# Patient Record
Sex: MALE | Race: WHITE | NOT HISPANIC OR LATINO | Employment: OTHER | URBAN - METROPOLITAN AREA
[De-identification: names, ages, dates, MRNs, and addresses within clinical notes are randomized per-mention and may not be internally consistent; named-entity substitution may affect disease eponyms.]

---

## 2017-07-07 ENCOUNTER — OFFICE VISIT (OUTPATIENT)
Dept: CARDIOLOGY | Facility: CLINIC | Age: 72
End: 2017-07-07

## 2017-07-07 VITALS
WEIGHT: 194 LBS | SYSTOLIC BLOOD PRESSURE: 102 MMHG | BODY MASS INDEX: 29.4 KG/M2 | HEIGHT: 68 IN | HEART RATE: 74 BPM | DIASTOLIC BLOOD PRESSURE: 59 MMHG

## 2017-07-07 DIAGNOSIS — I10 ESSENTIAL HYPERTENSION: ICD-10-CM

## 2017-07-07 DIAGNOSIS — E78.5 DYSLIPIDEMIA: ICD-10-CM

## 2017-07-07 DIAGNOSIS — I25.10 CORONARY ARTERY DISEASE INVOLVING NATIVE CORONARY ARTERY OF NATIVE HEART WITHOUT ANGINA PECTORIS: Primary | ICD-10-CM

## 2017-07-07 PROCEDURE — 99213 OFFICE O/P EST LOW 20 MIN: CPT | Performed by: INTERNAL MEDICINE

## 2017-07-07 NOTE — PROGRESS NOTES
Encounter Date:07/07/2017      Patient ID: Adam Coffey is a 72 y.o. male.    Chief Complaint: Coronary Artery Disease    PROBLEM LIST:  1. Coronary artery disease:  a.  Onset of cardiac symptoms with chest pain in 2004.  b. Subsequent office evaluation revealed abnormal EKG.  c. Cardiac catheterization study by Dr. Layton in 2004, showed totally occluded LAD, no other significant disease, ejection fraction 45% with large  anteroapical inferior left ventricular aneurysm, subsequent medical management.  d. Most recent Cardiolite stress test, June 2012, by Dr. Layton was remarkable for submaximal exercise and ejection fraction 58% with previous scar noted.  No evidence of  acute ischemia.  e. Echocardiogram 07/14/2015, showed an ejection fraction of 55% to 60% with mild mitral, mild tricuspid and trace pulmonic insufficiency.    2. Ischemic cardiomyopathy as mentioned above with subsequent improvement of LV function.  3. History of hypertension.  4. History of dyslipidemia.  5. Degenerative  Arthritis.  6. Surgical Hx:  a. Status post remote back surgery.    History of Present Illness  Patient presents today for follow-up of CAD, cardiomyopathy and cardiac risk factors. Has been doing great from a cardiac standpoint. Compliant with all medications. Missed his last appointment as he suffered a viral infection and states that he felt great from a cardiac standpoint at that time too. Denies chest pain, shortness of breath, leg swelling, palpitations, and syncope. Remains busy and active using the elliptical and weight training.    No Known Allergies      Current Outpatient Prescriptions:   •  aspirin 325 MG tablet, Take 325 mg by mouth daily., Disp: , Rfl:   •  atorvastatin (LIPITOR) 10 MG tablet, Take 10 mg by mouth daily., Disp: , Rfl:   •  carvedilol (COREG) 25 MG tablet, 25 mg 2 (two) times a day., Disp: , Rfl:   •  fluticasone (FLONASE) 50 MCG/ACT nasal spray, 1 spray into each nostril daily., Disp: , Rfl:  "  •  FOLIC ACID PO, Take  by mouth daily., Disp: , Rfl:   •  LORazepam (ATIVAN) 1 MG tablet, Take 1 mg by mouth every 8 (eight) hours as needed for anxiety., Disp: , Rfl:   •  quinapril (ACCUPRIL) 20 MG tablet, Take 10 mg by mouth daily., Disp: , Rfl:   •  sertraline (ZOLOFT) 100 MG tablet, 100 mg daily., Disp: , Rfl:   •  Tetrahydrozoline HCl (EYE DROPS OP), Apply  to eye daily., Disp: , Rfl:   •  traMADol (ULTRAM) 50 MG tablet, Take 50 mg by mouth every 6 (six) hours as needed for moderate pain (4-6)., Disp: , Rfl:     The following portions of the patient's history were reviewed and updated as appropriate: allergies, current medications, past family history, past medical history, past social history, past surgical history and problem list.    ROS  Review of Systems   Constitution: Negative for chills, fever, weight gain and weight loss.   Cardiovascular: Negative for chest pain, claudication, dyspnea on exertion, leg swelling, orthopnea, palpitations, paroxysmal nocturnal dyspnea and syncope.        No dizziness   Gastrointestinal: Negative for abdominal pain, constipation, diarrhea, nausea and vomiting.   Genitourinary:        No urinary symptoms   Neurological:        No symptoms of stroke.   All other systems reviewed and are negative.    Objective:     Blood pressure 102/59, pulse 74, height 68\" (172.7 cm), weight 194 lb (88 kg).  Repeat measurement by Brandt Castle MD at 108/64      Physical Exam  Constitutional: She appears well-developed and well-nourished.   HENT:   HEENT exam unremarkable.   Neck: Neck supple. No JVD present.   No carotid bruits.   Cardiovascular: Normal rate, regular rhythm and normal heart sounds.    No murmur heard.  2 plus symmetric pulses.   Pulmonary/Chest: Breath sounds normal. Does not exhibit tenderness.   Abdominal:   Abdomen benign.   Musculoskeletal: Does not exhibit edema.   Neurological:   Neurological exam unremarkable.   Vitals reviewed.    Lab Review:   CBC, HCV, TSH, CMP " are all WNL.  Lipid Panel      HDL 49  LDL 59  Procedures       Assessment:      Diagnosis Plan   1. Coronary artery disease involving native coronary artery of native heart without angina pectoris     2. Essential hypertension     3. Dyslipidemia       Plan:   Patient is doing well overall from a cardiac standpoint.  Continue current medications.   FU in 12 MO, sooner as needed.  Thank you for allowing us to participate in the care of your patient.     I, Brandt Castle MD, personally performed the services described in this documentation as scribed by the above named individual in my presence, and it is both accurate and complete.  7/7/2017  11:53 AM        Please note that portions of this note may have been completed with a voice recognition program. Efforts were made to edit the dictations, but occasionally words are mistranscribed.

## 2018-07-13 ENCOUNTER — OFFICE VISIT (OUTPATIENT)
Dept: CARDIOLOGY | Facility: CLINIC | Age: 73
End: 2018-07-13

## 2018-07-13 VITALS
SYSTOLIC BLOOD PRESSURE: 104 MMHG | BODY MASS INDEX: 29.4 KG/M2 | HEART RATE: 75 BPM | HEIGHT: 68 IN | WEIGHT: 194 LBS | DIASTOLIC BLOOD PRESSURE: 62 MMHG

## 2018-07-13 DIAGNOSIS — I25.10 CORONARY ARTERY DISEASE INVOLVING NATIVE CORONARY ARTERY OF NATIVE HEART WITHOUT ANGINA PECTORIS: Primary | ICD-10-CM

## 2018-07-13 DIAGNOSIS — I10 ESSENTIAL HYPERTENSION: ICD-10-CM

## 2018-07-13 DIAGNOSIS — E78.5 DYSLIPIDEMIA: ICD-10-CM

## 2018-07-13 PROCEDURE — 99214 OFFICE O/P EST MOD 30 MIN: CPT | Performed by: PHYSICIAN ASSISTANT

## 2018-07-13 NOTE — PROGRESS NOTES
Encounter Date:07/13/2018      Patient ID: Adam Coffey is a 73 y.o. male.    Albert Alejandro MD    Chief Complaint: Coronary Artery Disease        Patient Active Problem List    Diagnosis   • CAD (coronary artery disease) [I25.10]     Priority: High     Overview Note:       a. Cardiac catheterization study by Dr. Layton in 2004, showed totally occluded LAD, no other significant disease, ejection fraction 45% with large anteroapical inferior left ventricular aneurysm, subsequent medical management.  b. Most recent Cardiolite stress test, June 2012, by Dr. Layton was remarkable for submaximal exercise and ejection fraction 58% with previous scar noted.  No evidence of acute ischemia.  c. Echocardiogram 07/14/2015, showed an ejection fraction of 55% to 60% with mild mitral, mild tricuspid and trace pulmonic insufficiency     • HTN (hypertension) [I10]     Priority: Medium   • Dyslipidemia [E78.5]   • Degenerative arthritis [M19.90]         History of Present Illness  Patient presents today for follow-up with a history of CAD, ischemic cardiomyopathy with subsequent normalization, hypertension and dyslipidemia.  He returns today for annual follow-up.  He has no current complaint of exertional chest pain, no orthopnea, no PND no lower extremity edema.  He has no exertional dyspnea.  He has no awareness of tachycardia arrhythmias, no dizziness or syncope.  States he checks his blood pressure at home and it generally runs 120 to 130s.  His cholesterol is followed by his primary care physician and reported to be favorable.  His only complaint today is easy bruising of the upper extremities.  He states compliance with his medical regimen and reports no other significant side effects.    No Known Allergies      Current Outpatient Prescriptions:   •  aspirin 325 MG tablet, Take 325 mg by mouth daily., Disp: , Rfl:   •  atorvastatin (LIPITOR) 10 MG tablet, Take 10 mg by mouth daily., Disp: , Rfl:   •  carvedilol (COREG)  "25 MG tablet, 25 mg 2 (two) times a day., Disp: , Rfl:   •  fluticasone (FLONASE) 50 MCG/ACT nasal spray, 1 spray into each nostril daily., Disp: , Rfl:   •  FOLIC ACID PO, Take  by mouth daily., Disp: , Rfl:   •  LORazepam (ATIVAN) 1 MG tablet, Take 1 mg by mouth every 8 (eight) hours as needed for anxiety., Disp: , Rfl:   •  sertraline (ZOLOFT) 100 MG tablet, 100 mg daily., Disp: , Rfl:   •  traMADol (ULTRAM) 50 MG tablet, Take 50 mg by mouth every 6 (six) hours as needed for moderate pain (4-6)., Disp: , Rfl:     The following portions of the patient's history were reviewed and updated as appropriate: allergies, current medications, past family history, past medical history, past social history, past surgical history and problem list.    Review of Systems   Constitution: Negative for diaphoresis, weakness, malaise/fatigue and weight gain.   Cardiovascular: Negative for chest pain, claudication, dyspnea on exertion, irregular heartbeat, leg swelling, orthopnea, palpitations, paroxysmal nocturnal dyspnea and syncope.   Respiratory: Negative for cough, shortness of breath, sleep disturbances due to breathing and wheezing.    Hematologic/Lymphatic: Negative for bleeding problem.   Musculoskeletal: Negative for muscle cramps, muscle weakness and myalgias.   Gastrointestinal: Negative for heartburn.     .    Objective:     Blood pressure 104/62, pulse 75, height 172.7 cm (68\"), weight 88 kg (194 lb).           Body mass index is 29.5 kg/m².     Physical Exam   Constitutional: He is oriented to person, place, and time. He appears well-developed and well-nourished.   Cardiovascular: Normal rate, regular rhythm, normal heart sounds and intact distal pulses.  Exam reveals no gallop and no friction rub.    No murmur heard.  Pulmonary/Chest: Effort normal and breath sounds normal. No respiratory distress. He has no wheezes. He has no rales. He exhibits no tenderness.   Bases clear   Musculoskeletal: Normal range of motion. " He exhibits no edema.   Neurological: He is alert and oriented to person, place, and time.       Lab Review:                           Procedures             Assessment:      Diagnosis Plan   1. Coronary artery disease involving native coronary artery of native heart without angina pectoris  Stable    2. Dyslipidemia  On statin therapy and followed by primary care    3. Essential hypertension  Well controlled current medical regimen      Plan:     He may decrease aspirin to 81 mg daily to reduce bruising    Stable cardiac status.  Continue current medications.   in 12 months, sooner as needed.  Thank you for allowing us to participate in the care of your patient.     JEANNA Scherer  07/13/18  1:39 PM      Please note that portions of this note may have been completed with a voice recognition program. Efforts were made to edit the dictations, but occasionally words are mistr

## 2024-04-03 NOTE — PROGRESS NOTES
"[facility]  Cardiology Consultation Note     Adam Coffey  1945  Requesting Provider: Albert Alejandro MD  PCP: Albert Alejandro MD    ID:  Adam Coffey is a 79 y.o. male who resides in [place], [place].     REASON FOR CONSULTATION:    CAD  Ischemic heart disease         Dear Dr. Alejandro:    Thank you for referring Adam Coffey to my office to establish care of his coronary disease and cardiovascular risk factors.  He is a 79-year-old gentleman who was initially taken care of by Dr. Slade Layton.  In 2004, the patient underwent cardiac catheterization and was found to have chronic occlusion of the LAD with LVEF 45% and anterior apical ventricular aneurysm.  Medical treatment was recommended.  The patient has had a stable clinical course since that time.  An echocardiogram in 2015 showed normal LV systolic function.  Patient's last ischemic evaluation was in 2012.    The patient recently has been not wanting to do activities.  He does not describe chest pressure, but does state he does feel more short of breath than he has in the past.  Denies orthopnea or PND.  He feels like he does not \"want\" to do activity.  His wife remarks that he is on an antidepressant medication.      Past Medical History, Past Surgical History, Family history, Social History, and Medications were all reviewed with the patient today and updated as necessary.       Current Outpatient Medications:     aspirin 81 MG EC tablet, Take 1 tablet by mouth Daily., Disp: 90 tablet, Rfl: 3    atorvastatin (Lipitor) 10 MG tablet, Take 1 tablet by mouth Daily., Disp: 90 tablet, Rfl: 3    carvedilol (COREG) 25 MG tablet, Take 1 tablet by mouth 2 (Two) Times a Day With Meals., Disp: 180 tablet, Rfl: 3    fluticasone (FLONASE) 50 MCG/ACT nasal spray, 1 spray into the nostril(s) as directed by provider Daily., Disp: , Rfl:     FOLIC ACID PO, Take  by mouth daily., Disp: , Rfl:     LORazepam (ATIVAN) 1 MG tablet, Take 1 tablet by " "mouth Every 8 (Eight) Hours As Needed for Anxiety., Disp: , Rfl:     sertraline (ZOLOFT) 100 MG tablet, 1 tablet Daily., Disp: , Rfl:     tamsulosin (FLOMAX) 0.4 MG capsule 24 hr capsule, Take 1 capsule by mouth Daily., Disp: , Rfl:     traMADol (ULTRAM) 50 MG tablet, Take 1 tablet by mouth Every 6 (Six) Hours As Needed for Moderate Pain., Disp: , Rfl:     No Known Allergies      Past Medical History:   Diagnosis Date    CAD (coronary artery disease)     Degenerative arthritis     Dyslipidemia     Elevated blood sugar     HTN (hypertension)     Ischemic cardiomyopathy        Past Surgical History:   Procedure Laterality Date    BACK SURGERY      CARDIAC CATHETERIZATION      KNEE ARTHROPLASTY, PARTIAL REPLACEMENT      REPLACEMENT TOTAL KNEE         Family History   Problem Relation Age of Onset    Cancer Mother     Heart attack Father        Social History     Tobacco Use    Smoking status: Former     Types: Cigarettes     Start date:      Quit date:      Years since quittin.3     Passive exposure: Past    Smokeless tobacco: Never    Tobacco comments:     quit 40 years ago   Substance Use Topics    Alcohol use: Not on file     Comment: occasionally       Review of Systems   Constitutional: Positive for malaise/fatigue.   Respiratory:  Positive for shortness of breath.    Neurological:  Positive for dizziness.               /74 (BP Location: Left arm, Patient Position: Sitting)   Pulse 72   Ht 172.7 cm (68\")   Wt 85.5 kg (188 lb 6.4 oz)   SpO2 97%   BMI 28.65 kg/m²        Constitutional:       Appearance: Healthy appearance. Well-developed.   Eyes:      General: Lids are normal. No scleral icterus.     Conjunctiva/sclera: Conjunctivae normal.   HENT:      Head: Normocephalic and atraumatic.   Neck:      Thyroid: No thyromegaly.      Vascular: No carotid bruit or JVD.   Pulmonary:      Effort: Pulmonary effort is normal.      Breath sounds: Normal breath sounds. No wheezing. No rhonchi. No " rales.   Cardiovascular:      Normal rate. Regular rhythm.      Murmurs: There is no murmur.      No gallop.  No rub.   Pulses:     Intact distal pulses.   Edema:     Peripheral edema absent.   Abdominal:      General: There is no distension.      Palpations: Abdomen is soft. There is no abdominal mass.   Musculoskeletal:      Cervical back: Normal range of motion. Skin:     General: Skin is warm and dry.      Findings: No rash.   Neurological:      General: No focal deficit present.      Mental Status: Alert and oriented to person, place, and time.      Gait: Gait is intact.   Psychiatric:         Attention and Perception: Attention normal.         Mood and Affect: Mood normal.         Behavior: Behavior normal.         EKG (3/6/2024): Sinus rhythm.  Left axis deviation.  Incomplete right bundle maryam block.  Anterior lateral infarct      Labs (12/4/2023):  WBC 4.4, RBC 4.45, HGB 10.6, HCT 35.3,   HA1C 6.1  Chol 126, Trig 98, HDL 45, LDL 63  Glucose 108, creat 1.06, BUN 14, Na 143, K 4.5, AST 16, ALT 10  TSH 2.96         Diagnoses and all orders for this visit:    1. Coronary artery disease involving native coronary artery of native heart with angina pectoris (Primary)  Overview:  Cardiac catheterization with Slade Layton (2004): Occluded LAD.  LVEF 45% with large anteroapical inferior left ventricular aneurysm.  Medical management  Nuclear stress (June 2012): Dr. Layton was remarkable for submaximal exercise and ejection fraction 58% with previous scar noted.  No evidence of acute ischemia.  Echo (07/14/2015): LVEF 55% to 60% with mild MR and TR.    Assessment & Plan:  No angina but possible heart failure symptoms  No recent ischemic evaluation  Recommend pharmacologic nuclear stress   Continue aspirin, beta-blocker, statin    Orders:  -     aspirin 81 MG EC tablet; Take 1 tablet by mouth Daily.  Dispense: 90 tablet; Refill: 3  -     Adult Transthoracic Echo Complete W/ Cont if Necessary Per Protocol;  Future  -     Stress Test With Myocardial Perfusion (1 Day); Future    2. Chronic HFrEF (heart failure with reduced ejection fraction)  Overview:  LVEF 45% on left ventriculogram, 2004  Echo (7/14/2015): LVEF 58%    Assessment & Plan:  Obtain proBNP  Obtain echo    Orders:  -     carvedilol (COREG) 25 MG tablet; Take 1 tablet by mouth 2 (Two) Times a Day With Meals.  Dispense: 180 tablet; Refill: 3  -     Adult Transthoracic Echo Complete W/ Cont if Necessary Per Protocol; Future  -     Stress Test With Myocardial Perfusion (1 Day); Future  -     proBNP; Future  -     Comprehensive Metabolic Panel; Future    3. Hyperlipidemia LDL goal <70  Overview:  High intensity statin therapy indicated given the presence of CAD    Assessment & Plan:  LDL well-controlled  Continue atorvastatin    Orders:  -     atorvastatin (Lipitor) 10 MG tablet; Take 1 tablet by mouth Daily.  Dispense: 90 tablet; Refill: 3    4. Primary hypertension  Overview:  Target blood pressure <130/80 mmHg    Assessment & Plan:  Well-controlled      5. Dyspnea on exertion  Assessment & Plan:  Functional class III shortness of breath  Obtain proBNP  Obtain echo  If evidence of HFpEF based on pro BNP, will consider SGL 2 inhibitor  If evidence of HFrEF based on echo and proBNP, will recommend ARB, SGL 2, and MRA                   proBNP  Echo  Nuclear stress test  Further recommendations to follow  Return in about 3 months (around 7/9/2024) for Follow-up with cardiology APRN/PA.        KRYSTYNA Trujillo MD, FAC, [facility]  Interventional Cardiology  04/09/24  10:49 EDT

## 2024-04-09 ENCOUNTER — LAB (OUTPATIENT)
Dept: LAB | Facility: HOSPITAL | Age: 79
End: 2024-04-09
Payer: MEDICARE

## 2024-04-09 ENCOUNTER — PATIENT ROUNDING (BHMG ONLY) (OUTPATIENT)
Dept: CARDIOLOGY | Facility: CLINIC | Age: 79
End: 2024-04-09
Payer: MEDICARE

## 2024-04-09 ENCOUNTER — OFFICE VISIT (OUTPATIENT)
Dept: CARDIOLOGY | Facility: CLINIC | Age: 79
End: 2024-04-09
Payer: MEDICARE

## 2024-04-09 VITALS
HEART RATE: 72 BPM | DIASTOLIC BLOOD PRESSURE: 74 MMHG | SYSTOLIC BLOOD PRESSURE: 124 MMHG | OXYGEN SATURATION: 97 % | HEIGHT: 68 IN | BODY MASS INDEX: 28.55 KG/M2 | WEIGHT: 188.4 LBS

## 2024-04-09 DIAGNOSIS — I50.22 CHRONIC HFREF (HEART FAILURE WITH REDUCED EJECTION FRACTION): ICD-10-CM

## 2024-04-09 DIAGNOSIS — E78.5 HYPERLIPIDEMIA LDL GOAL <70: ICD-10-CM

## 2024-04-09 DIAGNOSIS — R06.09 DYSPNEA ON EXERTION: ICD-10-CM

## 2024-04-09 DIAGNOSIS — I10 PRIMARY HYPERTENSION: ICD-10-CM

## 2024-04-09 DIAGNOSIS — I25.119 CORONARY ARTERY DISEASE INVOLVING NATIVE CORONARY ARTERY OF NATIVE HEART WITH ANGINA PECTORIS: Primary | ICD-10-CM

## 2024-04-09 LAB
ALBUMIN SERPL-MCNC: 4.3 G/DL (ref 3.5–5.2)
ALBUMIN/GLOB SERPL: 2 G/DL
ALP SERPL-CCNC: 61 U/L (ref 39–117)
ALT SERPL W P-5'-P-CCNC: 17 U/L (ref 1–41)
ANION GAP SERPL CALCULATED.3IONS-SCNC: 7.8 MMOL/L (ref 5–15)
AST SERPL-CCNC: 15 U/L (ref 1–40)
BILIRUB SERPL-MCNC: 0.6 MG/DL (ref 0–1.2)
BUN SERPL-MCNC: 17 MG/DL (ref 8–23)
BUN/CREAT SERPL: 14.5 (ref 7–25)
CALCIUM SPEC-SCNC: 9.3 MG/DL (ref 8.6–10.5)
CHLORIDE SERPL-SCNC: 107 MMOL/L (ref 98–107)
CO2 SERPL-SCNC: 28.2 MMOL/L (ref 22–29)
CREAT SERPL-MCNC: 1.17 MG/DL (ref 0.76–1.27)
EGFRCR SERPLBLD CKD-EPI 2021: 64.2 ML/MIN/1.73
GLOBULIN UR ELPH-MCNC: 2.2 GM/DL
GLUCOSE SERPL-MCNC: 96 MG/DL (ref 65–99)
NT-PROBNP SERPL-MCNC: 532 PG/ML (ref 0–1800)
POTASSIUM SERPL-SCNC: 4.8 MMOL/L (ref 3.5–5.2)
PROT SERPL-MCNC: 6.5 G/DL (ref 6–8.5)
SODIUM SERPL-SCNC: 143 MMOL/L (ref 136–145)

## 2024-04-09 PROCEDURE — 3078F DIAST BP <80 MM HG: CPT | Performed by: INTERNAL MEDICINE

## 2024-04-09 PROCEDURE — 36415 COLL VENOUS BLD VENIPUNCTURE: CPT

## 2024-04-09 PROCEDURE — 3074F SYST BP LT 130 MM HG: CPT | Performed by: INTERNAL MEDICINE

## 2024-04-09 PROCEDURE — 83880 ASSAY OF NATRIURETIC PEPTIDE: CPT

## 2024-04-09 PROCEDURE — 80053 COMPREHEN METABOLIC PANEL: CPT

## 2024-04-09 PROCEDURE — 1159F MED LIST DOCD IN RCRD: CPT | Performed by: INTERNAL MEDICINE

## 2024-04-09 PROCEDURE — 99204 OFFICE O/P NEW MOD 45 MIN: CPT | Performed by: INTERNAL MEDICINE

## 2024-04-09 PROCEDURE — 1160F RVW MEDS BY RX/DR IN RCRD: CPT | Performed by: INTERNAL MEDICINE

## 2024-04-09 RX ORDER — ASPIRIN 81 MG/1
81 TABLET ORAL DAILY
Qty: 90 TABLET | Refills: 3 | Status: SHIPPED | OUTPATIENT
Start: 2024-04-09

## 2024-04-09 RX ORDER — CARVEDILOL 25 MG/1
25 TABLET ORAL 2 TIMES DAILY WITH MEALS
Qty: 180 TABLET | Refills: 3 | Status: SHIPPED | OUTPATIENT
Start: 2024-04-09

## 2024-04-09 RX ORDER — TAMSULOSIN HYDROCHLORIDE 0.4 MG/1
0.4 CAPSULE ORAL DAILY
COMMUNITY
Start: 2024-03-22

## 2024-04-09 RX ORDER — ATORVASTATIN CALCIUM 10 MG/1
10 TABLET, FILM COATED ORAL DAILY
Qty: 90 TABLET | Refills: 3 | Status: SHIPPED | OUTPATIENT
Start: 2024-04-09

## 2024-04-09 NOTE — PROGRESS NOTES
April 9, 2024    Hello, may I speak with Adam Coffey? Yes.    My name is Beatriz BERRY      I am  with [facility]  [address]  [place] [address] [phone].    Before we get started may I verify your date of birth? 1945, Yes, correct.    I am calling to officially welcome you to our practice and ask about your recent visit. Is this a good time to talk? Yes.    Tell me about your visit with us. What things went well?  Everything.       We're always looking for ways to make our patients' experiences even better. Do you have recommendations on ways we may improve?  No.    Overall were you satisfied with your first visit to our practice? Yes.       I appreciate you taking the time to speak with me today. Is there anything else I can do for you? No.      Thank you, and have a great day.

## 2024-04-09 NOTE — ASSESSMENT & PLAN NOTE
Functional class III shortness of breath  Obtain proBNP  Obtain echo  If evidence of HFpEF based on pro BNP, will consider SGL 2 inhibitor  If evidence of HFrEF based on echo and proBNP, will recommend ARB, SGL 2, and MRA

## 2024-04-09 NOTE — LETTER
"April 9, 2024     Albert Alejandro MD  [address] [address]    Patient: Adam Coffey   YOB: 1945   Date of Visit: 4/9/2024     Dear Albert Alejandro MD:       Thank you for referring Adam Coffey to me for evaluation. Below are the relevant portions of my assessment and plan of care.    If you have questions, please do not hesitate to call me. I look forward to following Adam along with you.         Sincerely,        Bobby Trujillo IV, MD        CC: No Recipients    Bobby Trujillo IV, MD  04/09/24 1050  Signed  [facility]  Cardiology Consultation Note     Adam Coffey  1945  Requesting Provider: Albert Alejandro MD  PCP: Albert Alejandro MD    ID:  Adam Coffey is a 79 y.o. male who resides in [place], [place].     REASON FOR CONSULTATION:    CAD  Ischemic heart disease         Dear Dr. Alejandro:    Thank you for referring Adam Coffey to my office to establish care of his coronary disease and cardiovascular risk factors.  He is a 79-year-old gentleman who was initially taken care of by Dr. Slade Layton.  In 2004, the patient underwent cardiac catheterization and was found to have chronic occlusion of the LAD with LVEF 45% and anterior apical ventricular aneurysm.  Medical treatment was recommended.  The patient has had a stable clinical course since that time.  An echocardiogram in 2015 showed normal LV systolic function.  Patient's last ischemic evaluation was in 2012.    The patient recently has been not wanting to do activities.  He does not describe chest pressure, but does state he does feel more short of breath than he has in the past.  Denies orthopnea or PND.  He feels like he does not \"want\" to do activity.  His wife remarks that he is on an antidepressant medication.      Past Medical History, Past Surgical History, Family history, Social History, and Medications were all reviewed with the patient today and updated as " necessary.       Current Outpatient Medications:   •  aspirin 81 MG EC tablet, Take 1 tablet by mouth Daily., Disp: 90 tablet, Rfl: 3  •  atorvastatin (Lipitor) 10 MG tablet, Take 1 tablet by mouth Daily., Disp: 90 tablet, Rfl: 3  •  carvedilol (COREG) 25 MG tablet, Take 1 tablet by mouth 2 (Two) Times a Day With Meals., Disp: 180 tablet, Rfl: 3  •  fluticasone (FLONASE) 50 MCG/ACT nasal spray, 1 spray into the nostril(s) as directed by provider Daily., Disp: , Rfl:   •  FOLIC ACID PO, Take  by mouth daily., Disp: , Rfl:   •  LORazepam (ATIVAN) 1 MG tablet, Take 1 tablet by mouth Every 8 (Eight) Hours As Needed for Anxiety., Disp: , Rfl:   •  sertraline (ZOLOFT) 100 MG tablet, 1 tablet Daily., Disp: , Rfl:   •  tamsulosin (FLOMAX) 0.4 MG capsule 24 hr capsule, Take 1 capsule by mouth Daily., Disp: , Rfl:   •  traMADol (ULTRAM) 50 MG tablet, Take 1 tablet by mouth Every 6 (Six) Hours As Needed for Moderate Pain., Disp: , Rfl:     No Known Allergies      Past Medical History:   Diagnosis Date   • CAD (coronary artery disease)    • Degenerative arthritis    • Dyslipidemia    • Elevated blood sugar    • HTN (hypertension)    • Ischemic cardiomyopathy        Past Surgical History:   Procedure Laterality Date   • BACK SURGERY     • CARDIAC CATHETERIZATION     • KNEE ARTHROPLASTY, PARTIAL REPLACEMENT     • REPLACEMENT TOTAL KNEE         Family History   Problem Relation Age of Onset   • Cancer Mother    • Heart attack Father        Social History     Tobacco Use   • Smoking status: Former     Types: Cigarettes     Start date:      Quit date:      Years since quittin.3     Passive exposure: Past   • Smokeless tobacco: Never   • Tobacco comments:     quit 40 years ago   Substance Use Topics   • Alcohol use: Not on file     Comment: occasionally       Review of Systems   Constitutional: Positive for malaise/fatigue.   Respiratory:  Positive for shortness of breath.    Neurological:  Positive for dizziness.       "         /74 (BP Location: Left arm, Patient Position: Sitting)   Pulse 72   Ht 172.7 cm (68\")   Wt 85.5 kg (188 lb 6.4 oz)   SpO2 97%   BMI 28.65 kg/m²        Constitutional:       Appearance: Healthy appearance. Well-developed.   Eyes:      General: Lids are normal. No scleral icterus.     Conjunctiva/sclera: Conjunctivae normal.   HENT:      Head: Normocephalic and atraumatic.   Neck:      Thyroid: No thyromegaly.      Vascular: No carotid bruit or JVD.   Pulmonary:      Effort: Pulmonary effort is normal.      Breath sounds: Normal breath sounds. No wheezing. No rhonchi. No rales.   Cardiovascular:      Normal rate. Regular rhythm.      Murmurs: There is no murmur.      No gallop.  No rub.   Pulses:     Intact distal pulses.   Edema:     Peripheral edema absent.   Abdominal:      General: There is no distension.      Palpations: Abdomen is soft. There is no abdominal mass.   Musculoskeletal:      Cervical back: Normal range of motion. Skin:     General: Skin is warm and dry.      Findings: No rash.   Neurological:      General: No focal deficit present.      Mental Status: Alert and oriented to person, place, and time.      Gait: Gait is intact.   Psychiatric:         Attention and Perception: Attention normal.         Mood and Affect: Mood normal.         Behavior: Behavior normal.         EKG (3/6/2024): Sinus rhythm.  Left axis deviation.  Incomplete right bundle maryam block.  Anterior lateral infarct      Labs (12/4/2023):  WBC 4.4, RBC 4.45, HGB 10.6, HCT 35.3,   HA1C 6.1  Chol 126, Trig 98, HDL 45, LDL 63  Glucose 108, creat 1.06, BUN 14, Na 143, K 4.5, AST 16, ALT 10  TSH 2.96         Diagnoses and all orders for this visit:    1. Coronary artery disease involving native coronary artery of native heart with angina pectoris (Primary)  Overview:  Cardiac catheterization with Slade Layton (2004): Occluded LAD.  LVEF 45% with large anteroapical inferior left ventricular aneurysm.  Medical " management  Nuclear stress (June 2012): Dr. Layton was remarkable for submaximal exercise and ejection fraction 58% with previous scar noted.  No evidence of acute ischemia.  Echo (07/14/2015): LVEF 55% to 60% with mild MR and TR.    Assessment & Plan:  No angina but possible heart failure symptoms  No recent ischemic evaluation  Recommend pharmacologic nuclear stress   Continue aspirin, beta-blocker, statin    Orders:  -     aspirin 81 MG EC tablet; Take 1 tablet by mouth Daily.  Dispense: 90 tablet; Refill: 3  -     Adult Transthoracic Echo Complete W/ Cont if Necessary Per Protocol; Future  -     Stress Test With Myocardial Perfusion (1 Day); Future    2. Chronic HFrEF (heart failure with reduced ejection fraction)  Overview:  LVEF 45% on left ventriculogram, 2004  Echo (7/14/2015): LVEF 58%    Assessment & Plan:  Obtain proBNP  Obtain echo    Orders:  -     carvedilol (COREG) 25 MG tablet; Take 1 tablet by mouth 2 (Two) Times a Day With Meals.  Dispense: 180 tablet; Refill: 3  -     Adult Transthoracic Echo Complete W/ Cont if Necessary Per Protocol; Future  -     Stress Test With Myocardial Perfusion (1 Day); Future  -     proBNP; Future  -     Comprehensive Metabolic Panel; Future    3. Hyperlipidemia LDL goal <70  Overview:  High intensity statin therapy indicated given the presence of CAD    Assessment & Plan:  LDL well-controlled  Continue atorvastatin    Orders:  -     atorvastatin (Lipitor) 10 MG tablet; Take 1 tablet by mouth Daily.  Dispense: 90 tablet; Refill: 3    4. Primary hypertension  Overview:  Target blood pressure <130/80 mmHg    Assessment & Plan:  Well-controlled      5. Dyspnea on exertion  Assessment & Plan:  Functional class III shortness of breath  Obtain proBNP  Obtain echo  If evidence of HFpEF based on pro BNP, will consider SGL 2 inhibitor  If evidence of HFrEF based on echo and proBNP, will recommend ARB, SGL 2, and MRA                   proBNP  Echo  Nuclear stress test  Further  recommendations to follow  Return in about 3 months (around 7/9/2024) for Follow-up with cardiology APRN/PA.        KRYSTYNA Trujillo MD, [facility], [facility]  Interventional Cardiology  04/09/24  10:49 EDT

## 2024-04-09 NOTE — ASSESSMENT & PLAN NOTE
No angina but possible heart failure symptoms  No recent ischemic evaluation  Recommend pharmacologic nuclear stress   Continue aspirin, beta-blocker, statin

## 2024-04-09 NOTE — PROGRESS NOTES
"Let him know that lab work performed yesterday shows normal kidney function, normal potassium.  proBNP was in \"gray zone\" and certainly does suggest overt heart failure as a cause of his symptoms."

## 2024-04-23 ENCOUNTER — HOSPITAL ENCOUNTER (OUTPATIENT)
Facility: HOSPITAL | Age: 79
Discharge: HOME | End: 2024-04-23
Payer: MEDICARE

## 2024-04-23 DIAGNOSIS — I25.119 CORONARY ARTERY DISEASE INVOLVING NATIVE CORONARY ARTERY OF NATIVE HEART WITH ANGINA PECTORIS: ICD-10-CM

## 2024-04-23 DIAGNOSIS — I50.22 CHRONIC HFREF (HEART FAILURE WITH REDUCED EJECTION FRACTION): ICD-10-CM

## 2024-04-23 PROCEDURE — 78452 HT MUSCLE IMAGE SPECT MULT: CPT

## 2024-04-23 PROCEDURE — 93017 CV STRESS TEST TRACING ONLY: CPT

## 2024-04-23 PROCEDURE — 0 TECHNETIUM SESTAMIBI: Performed by: INTERNAL MEDICINE

## 2024-04-23 PROCEDURE — A9500 TC99M SESTAMIBI: HCPCS | Performed by: INTERNAL MEDICINE

## 2024-04-23 PROCEDURE — 25010000002 REGADENOSON 0.4 MG/5ML SOLUTION: Performed by: INTERNAL MEDICINE

## 2024-04-23 RX ORDER — REGADENOSON 0.08 MG/ML
0.4 INJECTION, SOLUTION INTRAVENOUS ONCE
Status: COMPLETED | OUTPATIENT
Start: 2024-04-23 | End: 2024-04-23

## 2024-04-23 RX ORDER — REGADENOSON 0.08 MG/ML
INJECTION, SOLUTION INTRAVENOUS
Status: DISCONTINUED
Start: 2024-04-23 | End: 2024-04-23

## 2024-04-23 RX ADMIN — TECHNETIUM TC 99M SESTAMIBI 1 DOSE: 1 INJECTION INTRAVENOUS at 10:35

## 2024-04-23 RX ADMIN — TECHNETIUM TC 99M SESTAMIBI 1 DOSE: 1 INJECTION INTRAVENOUS at 12:30

## 2024-04-23 RX ADMIN — REGADENOSON 0.4 MG: 0.08 INJECTION, SOLUTION INTRAVENOUS at 12:26

## 2024-04-29 LAB
BH CV REST NUCLEAR ISOTOPE DOSE: 9.7 MCI
BH CV STRESS BP STAGE 2: NORMAL
BH CV STRESS BP STAGE 4: NORMAL
BH CV STRESS COMMENTS STAGE 1: NORMAL
BH CV STRESS DOSE REGADENOSON STAGE 1: 0.4
BH CV STRESS DURATION MIN STAGE 1: 1
BH CV STRESS DURATION MIN STAGE 2: 1
BH CV STRESS DURATION MIN STAGE 3: 1
BH CV STRESS DURATION MIN STAGE 4: 1
BH CV STRESS DURATION SEC STAGE 1: 0
BH CV STRESS DURATION SEC STAGE 2: 0
BH CV STRESS DURATION SEC STAGE 3: 0
BH CV STRESS DURATION SEC STAGE 4: 0
BH CV STRESS HR STAGE 1: 70
BH CV STRESS HR STAGE 2: 91
BH CV STRESS HR STAGE 3: 92
BH CV STRESS HR STAGE 4: 90
BH CV STRESS NUCLEAR ISOTOPE DOSE: 31 MCI
BH CV STRESS O2 STAGE 1: 97
BH CV STRESS O2 STAGE 2: 97
BH CV STRESS O2 STAGE 3: 97
BH CV STRESS O2 STAGE 4: 99
BH CV STRESS PROTOCOL 1: NORMAL
BH CV STRESS RECOVERY BP: NORMAL MMHG
BH CV STRESS RECOVERY HR: 80 BPM
BH CV STRESS RECOVERY O2: 99 %
BH CV STRESS STAGE 1: 1
BH CV STRESS STAGE 2: 2
BH CV STRESS STAGE 3: 3
BH CV STRESS STAGE 4: 4
LV EF NUC BP: 47 %
MAXIMAL PREDICTED HEART RATE: 143 BPM
PERCENT MAX PREDICTED HR: 65.73 %
STRESS BASELINE BP: NORMAL MMHG
STRESS BASELINE HR: 65 BPM
STRESS O2 SAT REST: 100 %
STRESS PERCENT HR: 77 %
STRESS POST ESTIMATED WORKLOAD: 1 METS
STRESS POST EXERCISE DUR MIN: 4 MIN
STRESS POST EXERCISE DUR SEC: 0 SEC
STRESS POST O2 SAT PEAK: 97 %
STRESS POST PEAK BP: NORMAL MMHG
STRESS POST PEAK HR: 94 BPM
STRESS TARGET HR: 122 BPM

## 2024-05-01 ENCOUNTER — HOSPITAL ENCOUNTER (OUTPATIENT)
Dept: CARDIOLOGY | Facility: HOSPITAL | Age: 79
Discharge: HOME | End: 2024-05-01
Admitting: INTERNAL MEDICINE
Payer: MEDICARE

## 2024-05-01 DIAGNOSIS — I50.22 CHRONIC HFREF (HEART FAILURE WITH REDUCED EJECTION FRACTION): ICD-10-CM

## 2024-05-01 DIAGNOSIS — I25.119 CORONARY ARTERY DISEASE INVOLVING NATIVE CORONARY ARTERY OF NATIVE HEART WITH ANGINA PECTORIS: ICD-10-CM

## 2024-05-01 PROCEDURE — 25010000002 SULFUR HEXAFLUORIDE MICROSPH 60.7-25 MG RECONSTITUTED SUSPENSION: Performed by: INTERNAL MEDICINE

## 2024-05-01 PROCEDURE — 93306 TTE W/DOPPLER COMPLETE: CPT

## 2024-05-01 RX ADMIN — SULFUR HEXAFLUORIDE 3 ML: KIT at 15:58

## 2024-05-02 PROBLEM — I51.3 LV (LEFT VENTRICULAR) MURAL THROMBUS: Status: ACTIVE | Noted: 2024-05-02

## 2024-05-02 LAB
BH CV ECHO MEAS - AO MAX PG: 6.6 MMHG
BH CV ECHO MEAS - AO MEAN PG: 4.2 MMHG
BH CV ECHO MEAS - AO ROOT DIAM: 2.6 CM
BH CV ECHO MEAS - AO V2 MAX: 128 CM/SEC
BH CV ECHO MEAS - AO V2 VTI: 26.9 CM
BH CV ECHO MEAS - IVS/LVPW: 0.99 CM
BH CV ECHO MEAS - IVSD: 0.94 CM
BH CV ECHO MEAS - LA DIMENSION: 4.2 CM
BH CV ECHO MEAS - LV MAX PG: 3.6 MMHG
BH CV ECHO MEAS - LV MEAN PG: 2 MMHG
BH CV ECHO MEAS - LV V1 MAX: 95 CM/SEC
BH CV ECHO MEAS - LV V1 VTI: 23.9 CM
BH CV ECHO MEAS - LVIDD: 4.7 CM
BH CV ECHO MEAS - LVIDS: 3.2 CM
BH CV ECHO MEAS - LVOT DIAM: 2 CM
BH CV ECHO MEAS - LVPWD: 0.95 CM
BH CV ECHO MEAS - MV A MAX VEL: 72 CM/SEC
BH CV ECHO MEAS - MV E MAX VEL: 61 CM/SEC
BH CV ECHO MEAS - MV E/A: 0.85
BH CV ECHO MEAS - MV MAX PG: 3.5 MMHG
BH CV ECHO MEAS - MV MEAN PG: 1.2 MMHG
BH CV ECHO MEAS - MV V2 VTI: 22.3 CM
BH CV ECHO MEAS - PA ACC TIME: 0.12 SEC
BH CV ECHO MEAS - RAP SYSTOLE: 3 MMHG
BH CV ECHO MEAS - RVSP: 31.7 MMHG
BH CV ECHO MEAS - TAPSE (>1.6): 2.12 CM
BH CV ECHO MEAS - TR MAX PG: 28.7 MMHG
BH CV ECHO MEAS - TR MAX VEL: 268 CM/SEC
BH CV XLRA - RV BASE: 4.09 CM
BH CV XLRA - RV LENGTH: 6.81 CM
BH CV XLRA - RV MID: 3.83 CM
BH CV XLRA - TDI S': 16 CM/SEC
LEFT ATRIUM VOLUME INDEX: 30.3 ML/M2
LV EF 2D ECHO EST: 45 %

## 2024-05-02 NOTE — PROGRESS NOTES
LV systolic function appears similar to what was described in the past.  He has what appears to be a thrombus and the area where he had his old heart attack.  I recommend he start apixaban 5 mg twice daily for treatment of LV thrombus.

## 2024-05-03 ENCOUNTER — TELEPHONE (OUTPATIENT)
Dept: CARDIOLOGY | Facility: CLINIC | Age: 79
End: 2024-05-03
Payer: MEDICARE

## 2024-05-03 DIAGNOSIS — I50.22 CHRONIC HFREF (HEART FAILURE WITH REDUCED EJECTION FRACTION): Primary | ICD-10-CM

## 2024-05-03 DIAGNOSIS — I51.3 LV (LEFT VENTRICULAR) MURAL THROMBUS: ICD-10-CM

## 2024-05-03 NOTE — TELEPHONE ENCOUNTER
Patient's wife called back to report they received the message and needed the script sent to [facility] instead.

## 2024-05-03 NOTE — TELEPHONE ENCOUNTER
----- Message from Jennifer JUNIOR sent at 5/2/2024  4:27 PM EDT -----  LV systolic function appears similar to what was described in the past.  He has what appears to be a thrombus and the area where he had his old heart attack.  I recommend he start apixaban 5 mg twice daily for treatment of LV thrombus.

## 2024-05-08 ENCOUNTER — TELEPHONE (OUTPATIENT)
Dept: CARDIOLOGY | Facility: CLINIC | Age: 79
End: 2024-05-08
Payer: MEDICARE

## 2024-07-08 NOTE — PROGRESS NOTES
"    Cardiology Outpatient Visit      Identification: Adam Coffey is a 79 y.o. male who resides in [place], [place]    Reason for visit:  Coronary Artery Disease, Dizziness (Pt calls it \"swimming\" where he feels lightheaded. Occurs everyday, believes it could be due to eliquis medication. ), and Fatigue      Subjective      Patient is a 79-year-old gentleman who returns today for follow-up of his coronary artery disease, chronic diastolic heart failure, LV thrombus and cardiac risk factors.  At his last visit he was seen in new consultation to establish cardiovascular care.  He underwent echocardiogram which showed a LV thrombus and he was started on Eliquis.  His LVEF was noted to be 40%.  He also underwent stress testing which showed anterior apical infarct consistent with his known occluded LAD but no significant ischemia was noted.  He denies any chest pain or shortness of breath but reports being tired.  He thinks he needs to start exercising again.  His wife reports Eliquis is costing them $500.  She is wanting to know if he will need to be on it long-term.  They have multitudes of questions regarding his heart failure.    Review of Systems   Constitutional: Positive for malaise/fatigue.   Eyes:  Negative for vision loss in left eye and vision loss in right eye.   Cardiovascular:  Negative for chest pain, dyspnea on exertion, near-syncope, orthopnea, palpitations, paroxysmal nocturnal dyspnea and syncope.   Musculoskeletal:  Negative for myalgias.   Neurological:  Negative for brief paralysis, excessive daytime sleepiness, focal weakness, numbness, paresthesias and weakness.   All other systems reviewed and are negative.      No Known Allergies      Current Outpatient Medications   Medication Instructions    apixaban (ELIQUIS) 5 mg, Oral, 2 Times Daily    Apoaequorin (PREVAGEN PO) Oral, Daily    aspirin 81 mg, Oral, Daily    atorvastatin (LIPITOR) 10 mg, Oral, Daily    carvedilol (COREG) 25 mg, Oral, " "2 Times Daily With Meals    fluticasone (FLONASE) 50 MCG/ACT nasal spray 1 spray, Nasal, Daily    FOLIC ACID PO Oral, Daily    LORazepam (ATIVAN) 1 mg, Every 8 Hours PRN    sertraline (ZOLOFT) 100 mg, Daily, For anxiety    tamsulosin (FLOMAX) 0.4 mg, Oral, Daily    traMADol (ULTRAM) 50 mg, Oral, Every 6 Hours PRN    VITAMIN D PO Oral, Daily, With C and Zinc         Objective     /62 (BP Location: Left arm, Patient Position: Sitting)   Pulse 67   Ht 165.1 cm (65\")   Wt 85.7 kg (189 lb)   SpO2 95%   BMI 31.45 kg/m²       Constitutional:       General: Awake.      Appearance: Healthy appearance.   Pulmonary:      Effort: Pulmonary effort is normal.      Breath sounds: Normal breath sounds.   Cardiovascular:      Normal rate. Regular rhythm.      Murmurs: There is no murmur.   Pulses:     Intact distal pulses.   Edema:     Peripheral edema absent.   Abdominal:      General: There is no distension.      Palpations: Abdomen is soft.   Skin:     General: Skin is warm and dry.   Neurological:      Mental Status: Alert and oriented to person, place and time.   Psychiatric:         Behavior: Behavior is cooperative.         Result Review  (reviewed with patient):            Lab Results   Component Value Date    GLUCOSE 96 04/09/2024    BUN 17 04/09/2024    CREATININE 1.17 04/09/2024    EGFR 64.2 04/09/2024    BCR 14.5 04/09/2024    K 4.8 04/09/2024    CO2 28.2 04/09/2024    CALCIUM 9.3 04/09/2024    ALBUMIN 4.3 04/09/2024    BILITOT 0.6 04/09/2024    AST 15 04/09/2024    ALT 17 04/09/2024     No results found for: \"WBC\", \"HGB\", \"HCT\", \"MCV\", \"PLT\"  Lab Results   Component Value Date    CHLPL 105 06/10/2015    TRIG 158 (H) 06/10/2015    HDL 33 (L) 06/10/2015    LDL 48 06/10/2015     Lab Results   Component Value Date    HGBA1C 5.6 07/14/2015           Assessment     Diagnoses and all orders for this visit:    1. Coronary artery disease involving native coronary artery of native heart with angina pectoris " (Primary)  Overview:  Cardiac catheterization with Slade Layton (2004): Occluded LAD.  LVEF 45% with large anteroapical inferior left ventricular aneurysm.  Medical management  Nuclear stress by Slade Layton (June 2012): scar noted.  No evidence of acute ischemia.  Echo (07/14/2015): LVEF 58%   Nuclear stress (4/23/2024): Anterior apical infarct with no significant ischemia.  LVEF 48%    Assessment & Plan:  No signs or symptoms of angina  Continue aspirin 81 mg daily      2. Chronic HFrEF (heart failure with reduced ejection fraction)  Overview:  LVEF 45% on left ventriculogram, 2004  Echo (7/14/2015): LVEF 58%  Echo (5/1/2024): LVEF 40% with apical akinesis.  Akinetic area associated with immobile LV thrombus.  No significant valve abnormality    Assessment & Plan:  Stable NYHA class II symptoms      3. LV (left ventricular) mural thrombus  Overview:  Echo (5/1/2024): LVEF 40% with apical akinesis.  Akinetic area associated with immobile LV thrombus.  No significant valve abnormality    Assessment & Plan:  Continue Eliquis 5 mg twice daily  Repeat limited echo scheduled for 8/5/2024  If patient will need anticoagulation long-term he is interested in a more financially feasible option such as Coumadin.  Have provided samples of Eliquis today.      4. Primary hypertension  Overview:  Target blood pressure <130/80 mmHg    Assessment & Plan:  Hypertension is controlled  Continue Coreg 25 mg twice daily      5. Hyperlipidemia LDL goal <70  Overview:  High intensity statin therapy indicated given the presence of CAD    Assessment & Plan:   Continue Lipitor 10 mg daily            Plan   Continue Eliquis for LV thrombus.  If anticoagulation will need to be long-term may need to consider switching to Coumadin for financial reasons  Repeat limited echo for reevaluation of LV thrombus on 8/5/2024.  Will contact patient with results once available and make further recommendations  Continue current medications.  Okay to start  using stationary bike and elliptical       Follow-up   Return in about 6 months (around 1/9/2025), or if symptoms worsen or fail to improve, for Follow-up with Dr. Trujillo next visit.      Beatriz Garcia, APRN  7/9/2024

## 2024-07-09 ENCOUNTER — OFFICE VISIT (OUTPATIENT)
Dept: CARDIOLOGY | Facility: CLINIC | Age: 79
End: 2024-07-09
Payer: MEDICARE

## 2024-07-09 VITALS
DIASTOLIC BLOOD PRESSURE: 62 MMHG | BODY MASS INDEX: 31.49 KG/M2 | HEART RATE: 67 BPM | SYSTOLIC BLOOD PRESSURE: 122 MMHG | OXYGEN SATURATION: 95 % | WEIGHT: 189 LBS | HEIGHT: 65 IN

## 2024-07-09 DIAGNOSIS — E78.5 HYPERLIPIDEMIA LDL GOAL <70: ICD-10-CM

## 2024-07-09 DIAGNOSIS — I10 PRIMARY HYPERTENSION: ICD-10-CM

## 2024-07-09 DIAGNOSIS — I25.119 CORONARY ARTERY DISEASE INVOLVING NATIVE CORONARY ARTERY OF NATIVE HEART WITH ANGINA PECTORIS: Primary | ICD-10-CM

## 2024-07-09 DIAGNOSIS — I51.3 LV (LEFT VENTRICULAR) MURAL THROMBUS: ICD-10-CM

## 2024-07-09 DIAGNOSIS — I50.22 CHRONIC HFREF (HEART FAILURE WITH REDUCED EJECTION FRACTION): ICD-10-CM

## 2024-07-09 PROCEDURE — 3074F SYST BP LT 130 MM HG: CPT | Performed by: NURSE PRACTITIONER

## 2024-07-09 PROCEDURE — 1159F MED LIST DOCD IN RCRD: CPT | Performed by: NURSE PRACTITIONER

## 2024-07-09 PROCEDURE — 1160F RVW MEDS BY RX/DR IN RCRD: CPT | Performed by: NURSE PRACTITIONER

## 2024-07-09 PROCEDURE — 3078F DIAST BP <80 MM HG: CPT | Performed by: NURSE PRACTITIONER

## 2024-07-09 PROCEDURE — 99214 OFFICE O/P EST MOD 30 MIN: CPT | Performed by: NURSE PRACTITIONER

## 2024-07-09 NOTE — ASSESSMENT & PLAN NOTE
Continue Eliquis 5 mg twice daily  Repeat limited echo scheduled for 8/5/2024  If patient will need anticoagulation long-term he is interested in a more financially feasible option such as Coumadin.  Have provided samples of Eliquis today.

## 2024-08-05 ENCOUNTER — HOSPITAL ENCOUNTER (OUTPATIENT)
Facility: HOSPITAL | Age: 79
Discharge: HOME | End: 2024-08-05
Admitting: INTERNAL MEDICINE
Payer: MEDICARE

## 2024-08-05 VITALS — BODY MASS INDEX: 31.48 KG/M2 | HEIGHT: 65 IN | WEIGHT: 188.93 LBS

## 2024-08-05 DIAGNOSIS — I51.3 LV (LEFT VENTRICULAR) MURAL THROMBUS: ICD-10-CM

## 2024-08-05 DIAGNOSIS — I50.22 CHRONIC HFREF (HEART FAILURE WITH REDUCED EJECTION FRACTION): ICD-10-CM

## 2024-08-05 LAB
BH CV ECHO MEAS - AO ROOT DIAM: 3.2 CM
BH CV ECHO MEAS - EDV(CUBED): 125 ML
BH CV ECHO MEAS - EDV(MOD-SP2): 99 ML
BH CV ECHO MEAS - EDV(MOD-SP4): 163 ML
BH CV ECHO MEAS - EF(MOD-BP): 47.2 %
BH CV ECHO MEAS - EF(MOD-SP2): 45.9 %
BH CV ECHO MEAS - EF(MOD-SP4): 46.7 %
BH CV ECHO MEAS - ESV(CUBED): 46.7 ML
BH CV ECHO MEAS - ESV(MOD-SP2): 53.6 ML
BH CV ECHO MEAS - ESV(MOD-SP4): 86.9 ML
BH CV ECHO MEAS - FS: 28 %
BH CV ECHO MEAS - IVS/LVPW: 0.92 CM
BH CV ECHO MEAS - IVSD: 1.1 CM
BH CV ECHO MEAS - LA DIMENSION: 3.7 CM
BH CV ECHO MEAS - LV DIASTOLIC VOL/BSA (35-75): 84.6 CM2
BH CV ECHO MEAS - LV MASS(C)D: 220.3 GRAMS
BH CV ECHO MEAS - LV SYSTOLIC VOL/BSA (12-30): 45.1 CM2
BH CV ECHO MEAS - LVIDD: 5 CM
BH CV ECHO MEAS - LVIDS: 3.6 CM
BH CV ECHO MEAS - LVOT AREA: 3.5 CM2
BH CV ECHO MEAS - LVOT DIAM: 2.1 CM
BH CV ECHO MEAS - LVPWD: 1.2 CM
BH CV ECHO MEAS - SV(MOD-SP2): 45.4 ML
BH CV ECHO MEAS - SV(MOD-SP4): 76.1 ML
BH CV ECHO MEAS - SVI(MOD-SP2): 23.6 ML/M2
BH CV ECHO MEAS - SVI(MOD-SP4): 39.5 ML/M2
BH CV XLRA - RV BASE: 3 CM
BH CV XLRA - RV LENGTH: 7.4 CM
BH CV XLRA - RV MID: 3.2 CM
LEFT ATRIUM VOLUME INDEX: 34.1 ML/M2
LV EF 2D ECHO EST: 47 %

## 2024-08-05 PROCEDURE — 25010000002 SULFUR HEXAFLUORIDE MICROSPH 60.7-25 MG RECONSTITUTED SUSPENSION: Performed by: INTERNAL MEDICINE

## 2024-08-05 PROCEDURE — 93325 DOPPLER ECHO COLOR FLOW MAPG: CPT

## 2024-08-05 PROCEDURE — 93308 TTE F-UP OR LMTD: CPT

## 2024-08-05 PROCEDURE — 93325 DOPPLER ECHO COLOR FLOW MAPG: CPT | Performed by: INTERNAL MEDICINE

## 2024-08-05 PROCEDURE — 93308 TTE F-UP OR LMTD: CPT | Performed by: INTERNAL MEDICINE

## 2024-08-05 RX ADMIN — SULFUR HEXAFLUORIDE 3 ML: KIT at 11:45

## 2024-08-07 NOTE — PROGRESS NOTES
No new findings on this echo.  Continue present medical therapy including anticoagulation for LV thrombus.

## 2024-09-04 RX ORDER — APIXABAN 5 MG/1
5 TABLET, FILM COATED ORAL 2 TIMES DAILY
Qty: 180 TABLET | Refills: 1 | Status: SHIPPED | OUTPATIENT
Start: 2024-09-04

## 2025-03-03 RX ORDER — APIXABAN 5 MG/1
5 TABLET, FILM COATED ORAL 2 TIMES DAILY
Qty: 180 TABLET | Refills: 1 | Status: SHIPPED | OUTPATIENT
Start: 2025-03-03 | End: 2025-03-06 | Stop reason: SDUPTHER

## 2025-03-05 NOTE — PROGRESS NOTES
"    Cardiology Outpatient Visit      Identification: Adam Coffey is a 80 y.o. male who resides in [place], [place].     Reason for visit:  CAD  HFrEF      Subjective      Sidney returns to the office today for routine follow.  Is been feeling well.  He has occasional dizziness upon standing.  Has been relatively sedentary lifestyle.  No TIA or stroke symptoms.  He and his wife ask about whether he needs long-term anticoagulation due to LV thrombus.    Review of Systems   Cardiovascular:  Negative for chest pain, claudication, cyanosis, dyspnea on exertion, irregular heartbeat, leg swelling, near-syncope, orthopnea, palpitations, paroxysmal nocturnal dyspnea and syncope.   Respiratory:  Negative for cough, hemoptysis, shortness of breath, sleep disturbances due to breathing, snoring, sputum production and wheezing.    Neurological:  Positive for dizziness.       No Known Allergies      Current Outpatient Medications   Medication Instructions    apixaban (ELIQUIS) 5 mg, Oral, 2 Times Daily    Apoaequorin (PREVAGEN PO) Daily    aspirin 81 mg, Oral, Daily    atorvastatin (LIPITOR) 10 mg, Oral, Daily    carvedilol (COREG) 25 mg, Oral, 2 Times Daily With Meals    fluticasone (FLONASE) 50 MCG/ACT nasal spray 1 spray, Daily    FOLIC ACID PO Daily    LORazepam (ATIVAN) 1 mg, Every 8 Hours PRN    sertraline (ZOLOFT) 100 mg, Daily    tamsulosin (FLOMAX) 0.4 mg, Daily    traMADol (ULTRAM) 50 mg, Every 6 Hours PRN    VITAMIN D PO Daily         Objective     /74 (BP Location: Right arm, Patient Position: Sitting, Cuff Size: Adult)   Pulse 73   Ht 162.6 cm (64\")   Wt 88.2 kg (194 lb 6.4 oz)   SpO2 97%   BMI 33.37 kg/m²       Constitutional:       Appearance: Healthy appearance.   Eyes:      General: No scleral icterus.  Neck:      Thyroid: No thyroid mass.      Vascular: No carotid bruit or JVD. JVD normal.   Pulmonary:      Effort: Pulmonary effort is normal.      Breath sounds: Normal breath sounds. "   Cardiovascular:      Normal rate. Regular rhythm.      Murmurs: There is no murmur.      No gallop.    Edema:     Peripheral edema absent.   Skin:     General: Skin is warm. There is no cyanosis.   Neurological:      General: No focal deficit present.      Mental Status: Alert.   Psychiatric:         Attention and Perception: Attention normal.         Result Review  (reviewed with patient):                    Assessment     Diagnoses and all orders for this visit:    1. Coronary artery disease involving native coronary artery of native heart with angina pectoris (Primary)  Overview:  Cardiac catheterization with Slade Layton (2004): Occluded LAD.  LVEF 45% with large anteroapical inferior left ventricular aneurysm.  Medical management  Nuclear stress by Slade Layton (June 2012): scar noted.  No evidence of acute ischemia.  Echo (07/14/2015): LVEF 58%   Nuclear stress (4/23/2024): Anterior apical infarct with no significant ischemia.  LVEF 48%    Assessment & Plan:  No angina  Continue aspirin, beta-blocker, statin    Orders:  -     Comprehensive Metabolic Panel; Future  -     LDL Cholesterol, Direct; Future  -     aspirin 81 MG EC tablet; Take 1 tablet by mouth Daily.  Dispense: 90 tablet; Refill: 3  -     CBC (No Diff); Future  -     TSH+Free T4; Future    2. Heart failure with improved ejection fraction (HFimpEF)  Overview:  LVEF 45% on left ventriculogram, 2004  Echo (7/14/2015): LVEF 58%  Echo (5/1/2024): LVEF 40% with apical akinesis.  Akinetic area associated with immobile LV thrombus.  No significant valve abnormality  Echo (8/5/2024): LVEF 47%.  Apical akinesis.  Small apical LV thrombus    Orders:  -     carvedilol (COREG) 25 MG tablet; Take 1 tablet by mouth 2 (Two) Times a Day With Meals.  Dispense: 180 tablet; Refill: 3  -     TSH+Free T4; Future    3. LV (left ventricular) mural thrombus  Overview:  Echo (5/1/2024): LVEF 40% with apical akinesis.  Akinetic area associated with immobile LV thrombus.  No  significant valve abnormality  Echo (8/5/2024): Small residual thrombus at LV apex    Assessment & Plan:  Unfortunately, I think patient will require long-term anticoagulation due to LV thrombus due to prior MI  We discussed warfarin as alternative to NOAC but patient prefers to stay on apixaban despite high cost    Orders:  -     apixaban (Eliquis) 5 MG tablet tablet; Take 1 tablet by mouth 2 (Two) Times a Day.  Dispense: 180 tablet; Refill: 3    4. Hyperlipidemia LDL goal <70  Overview:  High intensity statin therapy indicated given the presence of CAD    Assessment & Plan:  Obtain LP(a), direct LDL, CMP today    Orders:  -     Comprehensive Metabolic Panel; Future  -     LDL Cholesterol, Direct; Future  -     Lipoprotein A (LPA); Future          Plan   Obtain CMP, CBC, direct LDL, LP(a) today  Continue apixaban      Follow-up   Return in about 6 months (around 9/6/2025) for Follow-up with cardiology APRN/PA.        Kobi Trujillo MD, FACC, [facility]  3/6/2025

## 2025-03-06 ENCOUNTER — OFFICE VISIT (OUTPATIENT)
Dept: CARDIOLOGY | Facility: CLINIC | Age: 80
End: 2025-03-06
Payer: MEDICARE

## 2025-03-06 ENCOUNTER — LAB (OUTPATIENT)
Dept: LAB | Facility: HOSPITAL | Age: 80
End: 2025-03-06
Payer: MEDICARE

## 2025-03-06 VITALS
WEIGHT: 194.4 LBS | OXYGEN SATURATION: 97 % | HEART RATE: 73 BPM | HEIGHT: 64 IN | BODY MASS INDEX: 33.19 KG/M2 | DIASTOLIC BLOOD PRESSURE: 74 MMHG | SYSTOLIC BLOOD PRESSURE: 116 MMHG

## 2025-03-06 DIAGNOSIS — E78.5 HYPERLIPIDEMIA LDL GOAL <70: ICD-10-CM

## 2025-03-06 DIAGNOSIS — I51.3 LV (LEFT VENTRICULAR) MURAL THROMBUS: ICD-10-CM

## 2025-03-06 DIAGNOSIS — I50.32 HEART FAILURE WITH IMPROVED EJECTION FRACTION (HFIMPEF): ICD-10-CM

## 2025-03-06 DIAGNOSIS — I25.119 CORONARY ARTERY DISEASE INVOLVING NATIVE CORONARY ARTERY OF NATIVE HEART WITH ANGINA PECTORIS: Primary | ICD-10-CM

## 2025-03-06 DIAGNOSIS — I25.119 CORONARY ARTERY DISEASE INVOLVING NATIVE CORONARY ARTERY OF NATIVE HEART WITH ANGINA PECTORIS: ICD-10-CM

## 2025-03-06 LAB
ALBUMIN SERPL-MCNC: 4.2 G/DL (ref 3.5–5.2)
ALBUMIN/GLOB SERPL: 1.9 G/DL
ALP SERPL-CCNC: 64 U/L (ref 39–117)
ALT SERPL W P-5'-P-CCNC: 17 U/L (ref 1–41)
ANION GAP SERPL CALCULATED.3IONS-SCNC: 11 MMOL/L (ref 5–15)
ARTICHOKE IGE QN: 59 MG/DL (ref 0–100)
AST SERPL-CCNC: 21 U/L (ref 1–40)
BILIRUB SERPL-MCNC: 0.4 MG/DL (ref 0–1.2)
BUN SERPL-MCNC: 15 MG/DL (ref 8–23)
BUN/CREAT SERPL: 17.4 (ref 7–25)
CALCIUM SPEC-SCNC: 9.1 MG/DL (ref 8.6–10.5)
CHLORIDE SERPL-SCNC: 105 MMOL/L (ref 98–107)
CO2 SERPL-SCNC: 25 MMOL/L (ref 22–29)
CREAT SERPL-MCNC: 0.86 MG/DL (ref 0.76–1.27)
DEPRECATED RDW RBC AUTO: 44.9 FL (ref 37–54)
EGFRCR SERPLBLD CKD-EPI 2021: 88.6 ML/MIN/1.73
ERYTHROCYTE [DISTWIDTH] IN BLOOD BY AUTOMATED COUNT: 14.9 % (ref 12.3–15.4)
GLOBULIN UR ELPH-MCNC: 2.2 GM/DL
GLUCOSE SERPL-MCNC: 97 MG/DL (ref 65–99)
HCT VFR BLD AUTO: 36.3 % (ref 37.5–51)
HGB BLD-MCNC: 11.4 G/DL (ref 13–17.7)
MCH RBC QN AUTO: 26 PG (ref 26.6–33)
MCHC RBC AUTO-ENTMCNC: 31.4 G/DL (ref 31.5–35.7)
MCV RBC AUTO: 82.7 FL (ref 79–97)
PLATELET # BLD AUTO: 212 10*3/MM3 (ref 140–450)
PMV BLD AUTO: 10.3 FL (ref 6–12)
POTASSIUM SERPL-SCNC: 4.6 MMOL/L (ref 3.5–5.2)
PROT SERPL-MCNC: 6.4 G/DL (ref 6–8.5)
RBC # BLD AUTO: 4.39 10*6/MM3 (ref 4.14–5.8)
SODIUM SERPL-SCNC: 141 MMOL/L (ref 136–145)
T4 FREE SERPL-MCNC: 1.14 NG/DL (ref 0.92–1.68)
TSH SERPL DL<=0.05 MIU/L-ACNC: 1.84 UIU/ML (ref 0.27–4.2)
WBC NRBC COR # BLD AUTO: 5.39 10*3/MM3 (ref 3.4–10.8)

## 2025-03-06 PROCEDURE — 84443 ASSAY THYROID STIM HORMONE: CPT

## 2025-03-06 PROCEDURE — 83695 ASSAY OF LIPOPROTEIN(A): CPT

## 2025-03-06 PROCEDURE — 80053 COMPREHEN METABOLIC PANEL: CPT

## 2025-03-06 PROCEDURE — 85027 COMPLETE CBC AUTOMATED: CPT

## 2025-03-06 PROCEDURE — 83721 ASSAY OF BLOOD LIPOPROTEIN: CPT

## 2025-03-06 PROCEDURE — 36415 COLL VENOUS BLD VENIPUNCTURE: CPT

## 2025-03-06 PROCEDURE — 84439 ASSAY OF FREE THYROXINE: CPT

## 2025-03-06 RX ORDER — CARVEDILOL 25 MG/1
25 TABLET ORAL 2 TIMES DAILY WITH MEALS
Qty: 180 TABLET | Refills: 3 | Status: SHIPPED | OUTPATIENT
Start: 2025-03-06

## 2025-03-06 RX ORDER — ASPIRIN 81 MG/1
81 TABLET ORAL DAILY
Qty: 90 TABLET | Refills: 3 | Status: SHIPPED | OUTPATIENT
Start: 2025-03-06

## 2025-03-06 NOTE — LETTER
March 6, 2025     No Recipients    Patient: Adam Coffey   YOB: 1945   Date of Visit: 3/6/2025     Dear No Recipients:       Thank you for referring Adam Coffey to me for evaluation. Below are the relevant portions of my assessment and plan of care.    If you have questions, please do not hesitate to call me. I look forward to following Adam along with you.         Sincerely,        Bobby Trujillo IV, MD        CC: No Recipients    Bobby Trujillo IV, MD  03/06/25 1449  Incomplete      Cardiology Outpatient Visit      Identification: Adam Coffey is a 80 y.o. male who resides in [place], [place].     Reason for visit:  CAD  HFrEF      Subjective     Sidney returns to the office today for routine follow.  Is been feeling well.  He has occasional dizziness upon standing.  Has been relatively sedentary lifestyle.  No TIA or stroke symptoms.  He and his wife ask about whether he needs long-term anticoagulation due to LV thrombus.    Review of Systems   Cardiovascular:  Negative for chest pain, claudication, cyanosis, dyspnea on exertion, irregular heartbeat, leg swelling, near-syncope, orthopnea, palpitations, paroxysmal nocturnal dyspnea and syncope.   Respiratory:  Negative for cough, hemoptysis, shortness of breath, sleep disturbances due to breathing, snoring, sputum production and wheezing.    Neurological:  Positive for dizziness.       No Known Allergies      Current Outpatient Medications   Medication Instructions    apixaban (ELIQUIS) 5 mg, Oral, 2 Times Daily    Apoaequorin (PREVAGEN PO) Daily    aspirin 81 mg, Oral, Daily    atorvastatin (LIPITOR) 10 mg, Oral, Daily    carvedilol (COREG) 25 mg, Oral, 2 Times Daily With Meals    fluticasone (FLONASE) 50 MCG/ACT nasal spray 1 spray, Daily    FOLIC ACID PO Daily    LORazepam (ATIVAN) 1 mg, Every 8 Hours PRN    sertraline (ZOLOFT) 100 mg, Daily    tamsulosin (FLOMAX) 0.4 mg, Daily    traMADol (ULTRAM) 50 mg, Every 6  "Hours PRN    VITAMIN D PO Daily         Objective    /74 (BP Location: Right arm, Patient Position: Sitting, Cuff Size: Adult)   Pulse 73   Ht 162.6 cm (64\")   Wt 88.2 kg (194 lb 6.4 oz)   SpO2 97%   BMI 33.37 kg/m²       Constitutional:       Appearance: Healthy appearance.   Eyes:      General: No scleral icterus.  Neck:      Thyroid: No thyroid mass.      Vascular: No carotid bruit or JVD. JVD normal.   Pulmonary:      Effort: Pulmonary effort is normal.      Breath sounds: Normal breath sounds.   Cardiovascular:      Normal rate. Regular rhythm.      Murmurs: There is no murmur.      No gallop.    Edema:     Peripheral edema absent.   Skin:     General: Skin is warm. There is no cyanosis.   Neurological:      General: No focal deficit present.      Mental Status: Alert.   Psychiatric:         Attention and Perception: Attention normal.         Result Review (reviewed with patient):                    Assessment    Diagnoses and all orders for this visit:    1. Coronary artery disease involving native coronary artery of native heart with angina pectoris (Primary)  Overview:  Cardiac catheterization with Slade Layton (2004): Occluded LAD.  LVEF 45% with large anteroapical inferior left ventricular aneurysm.  Medical management  Nuclear stress by Slade Layton (June 2012): scar noted.  No evidence of acute ischemia.  Echo (07/14/2015): LVEF 58%   Nuclear stress (4/23/2024): Anterior apical infarct with no significant ischemia.  LVEF 48%    Assessment & Plan:  No angina  Continue aspirin, beta-blocker, statin    Orders:  -     Comprehensive Metabolic Panel; Future  -     LDL Cholesterol, Direct; Future  -     aspirin 81 MG EC tablet; Take 1 tablet by mouth Daily.  Dispense: 90 tablet; Refill: 3  -     CBC (No Diff); Future  -     TSH+Free T4; Future    2. Heart failure with improved ejection fraction (HFimpEF)  Overview:  LVEF 45% on left ventriculogram, 2004  Echo (7/14/2015): LVEF 58%  Echo (5/1/2024): " LVEF 40% with apical akinesis.  Akinetic area associated with immobile LV thrombus.  No significant valve abnormality  Echo (8/5/2024): LVEF 47%.  Apical akinesis.  Small apical LV thrombus    Orders:  -     carvedilol (COREG) 25 MG tablet; Take 1 tablet by mouth 2 (Two) Times a Day With Meals.  Dispense: 180 tablet; Refill: 3  -     TSH+Free T4; Future    3. LV (left ventricular) mural thrombus  Overview:  Echo (5/1/2024): LVEF 40% with apical akinesis.  Akinetic area associated with immobile LV thrombus.  No significant valve abnormality  Echo (8/5/2024): Small residual thrombus at LV apex    Assessment & Plan:  Unfortunately, I think patient will require long-term anticoagulation due to LV thrombus due to prior MI  We discussed warfarin as alternative to NOAC but patient prefers to stay on apixaban despite high cost    Orders:  -     apixaban (Eliquis) 5 MG tablet tablet; Take 1 tablet by mouth 2 (Two) Times a Day.  Dispense: 180 tablet; Refill: 3    4. Hyperlipidemia LDL goal <70  Overview:  High intensity statin therapy indicated given the presence of CAD    Assessment & Plan:  Obtain LP(a), direct LDL, CMP today    Orders:  -     Comprehensive Metabolic Panel; Future  -     LDL Cholesterol, Direct; Future  -     Lipoprotein A (LPA); Future          Plan  Obtain CMP, CBC, direct LDL, LP(a) today  Continue apixaban      Follow-up   Return in about 6 months (around 9/6/2025) for Follow-up with cardiology APRN/PA.        Kobi Trujillo MD, [facility], [facility]  3/6/2025     Bobby Trujillo IV, MD  03/06/25 1443  Sign when Signing Visit      Cardiology Outpatient Visit      Identification: Adam Coffey is a 80 y.o. male who resides in [place], [place].     Reason for visit:  CAD  HFrEF      Subjective     ***     Review of Systems   Cardiovascular:  Negative for chest pain, claudication, cyanosis, dyspnea on exertion, irregular heartbeat, leg swelling, near-syncope, orthopnea, palpitations, paroxysmal  "nocturnal dyspnea and syncope.   Respiratory:  Negative for cough, hemoptysis, shortness of breath, sleep disturbances due to breathing, snoring, sputum production and wheezing.    Neurological:  Positive for dizziness.       No Known Allergies      Current Outpatient Medications   Medication Instructions    Apoaequorin (PREVAGEN PO) Daily    aspirin 81 mg, Oral, Daily    atorvastatin (LIPITOR) 10 mg, Oral, Daily    carvedilol (COREG) 25 mg, Oral, 2 Times Daily With Meals    Eliquis 5 mg, Oral, 2 Times Daily    fluticasone (FLONASE) 50 MCG/ACT nasal spray 1 spray, Daily    FOLIC ACID PO Daily    LORazepam (ATIVAN) 1 mg, Every 8 Hours PRN    sertraline (ZOLOFT) 100 mg, Daily    tamsulosin (FLOMAX) 0.4 mg, Daily    traMADol (ULTRAM) 50 mg, Every 6 Hours PRN    VITAMIN D PO Daily         Objective    /74 (BP Location: Right arm, Patient Position: Sitting, Cuff Size: Adult)   Pulse 73   Ht 162.6 cm (64\")   Wt 88.2 kg (194 lb 6.4 oz)   SpO2 97%   BMI 33.37 kg/m²       Constitutional:       Appearance: Healthy appearance.   Eyes:      General: No scleral icterus.  Neck:      Thyroid: No thyroid mass.      Vascular: No carotid bruit or JVD. JVD normal.   Pulmonary:      Effort: Pulmonary effort is normal.      Breath sounds: Normal breath sounds.   Cardiovascular:      Normal rate. Regular rhythm.      Murmurs: There is no murmur.      No gallop.    Edema:     Peripheral edema absent.   Skin:     General: Skin is warm. There is no cyanosis.   Neurological:      General: No focal deficit present.      Mental Status: Alert.   Psychiatric:         Attention and Perception: Attention normal.         Result Review (reviewed with patient):                    Assessment    Diagnoses and all orders for this visit:    1. Coronary artery disease involving native coronary artery of native heart with angina pectoris (Primary)  Overview:  Cardiac catheterization with Slade Layton (2004): Occluded LAD.  LVEF 45% with large " anteroapical inferior left ventricular aneurysm.  Medical management  Nuclear stress by Slade Layton (June 2012): scar noted.  No evidence of acute ischemia.  Echo (07/14/2015): LVEF 58%   Nuclear stress (4/23/2024): Anterior apical infarct with no significant ischemia.  LVEF 48%      2. Hyperlipidemia LDL goal <70  Overview:  High intensity statin therapy indicated given the presence of CAD      3. Chronic HFrEF (heart failure with reduced ejection fraction)  Overview:  LVEF 45% on left ventriculogram, 2004  Echo (7/14/2015): LVEF 58%  Echo (5/1/2024): LVEF 40% with apical akinesis.  Akinetic area associated with immobile LV thrombus.  No significant valve abnormality  Echo (8/5/2024): LVEF 47%.  Apical akinesis.  Small apical LV thrombus            Plan  ***      Follow-up   No follow-ups on file.        Kobi Trujillo MD, FACC, [facility]  3/6/2025

## 2025-03-06 NOTE — ASSESSMENT & PLAN NOTE
Unfortunately, I think patient will require long-term anticoagulation due to LV thrombus due to prior MI  We discussed warfarin as alternative to NOAC but patient prefers to stay on apixaban despite high cost

## 2025-03-10 LAB — LPA SERPL-SCNC: 36.2 NMOL/L

## 2025-04-05 DIAGNOSIS — E78.5 HYPERLIPIDEMIA LDL GOAL <70: ICD-10-CM

## 2025-04-07 ENCOUNTER — TELEPHONE (OUTPATIENT)
Dept: CARDIOLOGY | Facility: CLINIC | Age: 80
End: 2025-04-07
Payer: MEDICARE

## 2025-04-07 RX ORDER — ATORVASTATIN CALCIUM 10 MG/1
10 TABLET, FILM COATED ORAL DAILY
Qty: 90 TABLET | Refills: 3 | Status: SHIPPED | OUTPATIENT
Start: 2025-04-07

## 2025-04-07 NOTE — TELEPHONE ENCOUNTER
Caller: Jaclyn Coffey    Relationship: Emergency Contact    Best call back number: [phone]    What is the best time to reach you: ANY    Who are you requesting to speak with (clinical staff, provider,  specific staff member): ANY      What was the call regarding: PT IS HAVING SURGERY ON APRIL 21ST AND NEEDS TO KNOW IF HE CAN GO OFF ELIQUIS FOR 5 DAYS BEFORE AND WHEN TO RE START IT     Is it okay if the provider responds through MyChart: NO

## 2025-04-07 NOTE — TELEPHONE ENCOUNTER
Clearance letter has been faxed to [facility]. I spoke to pt wife Jaclyn and informed her what Rach said about medication. She verbalized understanding.

## 2025-04-07 NOTE — TELEPHONE ENCOUNTER
Spoke to Jaclyn, who is on HIPAA. She states that pt is having a Vitreous Macular Traction procedure done on his eye on 4/21/25 with Dr. Mark Romero. 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 Pt will be having the procedure at [facility] in [place]. Jaclyn states they were told to call and see if pt could stop his Eliquis for 5 days prior to the procedure. She also states that pt was told to stop his ASA 2 weeks before the procedure, so he stopped that yesterday.    Jaclyn gave the number to [facility]. I spoke to Jennyfer, the surgery scheduler. She said they do need cardiac clearance, and the ok to hold the Eliquis for 5 days prior to procedure.     If clearance is given, will fax letter to [phone].